# Patient Record
Sex: MALE | Race: OTHER | HISPANIC OR LATINO
[De-identification: names, ages, dates, MRNs, and addresses within clinical notes are randomized per-mention and may not be internally consistent; named-entity substitution may affect disease eponyms.]

---

## 2018-11-06 ENCOUNTER — RESULT REVIEW (OUTPATIENT)
Age: 23
End: 2018-11-06

## 2018-11-06 ENCOUNTER — INPATIENT (INPATIENT)
Facility: HOSPITAL | Age: 23
LOS: 0 days | Discharge: HOME | DRG: 340 | End: 2018-11-07
Attending: GENERAL ACUTE CARE HOSPITAL | Admitting: GENERAL ACUTE CARE HOSPITAL
Payer: COMMERCIAL

## 2018-11-06 VITALS
DIASTOLIC BLOOD PRESSURE: 71 MMHG | WEIGHT: 154.32 LBS | TEMPERATURE: 100 F | RESPIRATION RATE: 18 BRPM | HEIGHT: 69 IN | HEART RATE: 105 BPM | OXYGEN SATURATION: 96 % | SYSTOLIC BLOOD PRESSURE: 136 MMHG

## 2018-11-06 LAB
ALBUMIN SERPL ELPH-MCNC: 4.2 G/DL — SIGNIFICANT CHANGE UP (ref 3.3–5)
ALP SERPL-CCNC: 58 U/L — SIGNIFICANT CHANGE UP (ref 40–120)
ALT FLD-CCNC: 11 U/L — SIGNIFICANT CHANGE UP (ref 10–45)
ANION GAP SERPL CALC-SCNC: 12 MMOL/L — SIGNIFICANT CHANGE UP (ref 5–17)
APPEARANCE UR: CLEAR — SIGNIFICANT CHANGE UP
APTT BLD: 27.5 SEC — SIGNIFICANT CHANGE UP (ref 27.5–36.3)
AST SERPL-CCNC: 23 U/L — SIGNIFICANT CHANGE UP (ref 10–40)
BASOPHILS NFR BLD AUTO: 0.2 % — SIGNIFICANT CHANGE UP (ref 0–2)
BILIRUB SERPL-MCNC: 0.7 MG/DL — SIGNIFICANT CHANGE UP (ref 0.2–1.2)
BILIRUB UR-MCNC: NEGATIVE — SIGNIFICANT CHANGE UP
BLD GP AB SCN SERPL QL: NEGATIVE — SIGNIFICANT CHANGE UP
BUN SERPL-MCNC: 9 MG/DL — SIGNIFICANT CHANGE UP (ref 7–23)
CALCIUM SERPL-MCNC: 8.7 MG/DL — SIGNIFICANT CHANGE UP (ref 8.4–10.5)
CHLORIDE SERPL-SCNC: 93 MMOL/L — LOW (ref 96–108)
CO2 SERPL-SCNC: 28 MMOL/L — SIGNIFICANT CHANGE UP (ref 22–31)
COLOR SPEC: YELLOW — SIGNIFICANT CHANGE UP
CREAT SERPL-MCNC: 1 MG/DL — SIGNIFICANT CHANGE UP (ref 0.5–1.3)
DIFF PNL FLD: NEGATIVE — SIGNIFICANT CHANGE UP
EOSINOPHIL NFR BLD AUTO: 1.8 % — SIGNIFICANT CHANGE UP (ref 0–6)
GLUCOSE SERPL-MCNC: 122 MG/DL — HIGH (ref 70–99)
GLUCOSE UR QL: NEGATIVE — SIGNIFICANT CHANGE UP
HCT VFR BLD CALC: 39.2 % — SIGNIFICANT CHANGE UP (ref 39–50)
HGB BLD-MCNC: 13.9 G/DL — SIGNIFICANT CHANGE UP (ref 13–17)
INR BLD: 1.21 — HIGH (ref 0.88–1.16)
KETONES UR-MCNC: NEGATIVE — SIGNIFICANT CHANGE UP
LEUKOCYTE ESTERASE UR-ACNC: NEGATIVE — SIGNIFICANT CHANGE UP
LYMPHOCYTES # BLD AUTO: 20.3 % — SIGNIFICANT CHANGE UP (ref 13–44)
MCHC RBC-ENTMCNC: 32.9 PG — SIGNIFICANT CHANGE UP (ref 27–34)
MCHC RBC-ENTMCNC: 35.5 G/DL — SIGNIFICANT CHANGE UP (ref 32–36)
MCV RBC AUTO: 92.7 FL — SIGNIFICANT CHANGE UP (ref 80–100)
MONOCYTES NFR BLD AUTO: 9.3 % — SIGNIFICANT CHANGE UP (ref 2–14)
NEUTROPHILS NFR BLD AUTO: 68.4 % — SIGNIFICANT CHANGE UP (ref 43–77)
NITRITE UR-MCNC: NEGATIVE — SIGNIFICANT CHANGE UP
PH UR: 6.5 — SIGNIFICANT CHANGE UP (ref 5–8)
PLATELET # BLD AUTO: 176 K/UL — SIGNIFICANT CHANGE UP (ref 150–400)
POTASSIUM SERPL-MCNC: 4 MMOL/L — SIGNIFICANT CHANGE UP (ref 3.5–5.3)
POTASSIUM SERPL-SCNC: 4 MMOL/L — SIGNIFICANT CHANGE UP (ref 3.5–5.3)
PROT SERPL-MCNC: 7.5 G/DL — SIGNIFICANT CHANGE UP (ref 6–8.3)
PROT UR-MCNC: NEGATIVE MG/DL — SIGNIFICANT CHANGE UP
PROTHROM AB SERPL-ACNC: 13.7 SEC — HIGH (ref 10–12.9)
RAPID RVP RESULT: SIGNIFICANT CHANGE UP
RBC # BLD: 4.23 M/UL — SIGNIFICANT CHANGE UP (ref 4.2–5.8)
RBC # FLD: 11.7 % — SIGNIFICANT CHANGE UP (ref 10.3–16.9)
RH IG SCN BLD-IMP: POSITIVE — SIGNIFICANT CHANGE UP
RH IG SCN BLD-IMP: POSITIVE — SIGNIFICANT CHANGE UP
SODIUM SERPL-SCNC: 133 MMOL/L — LOW (ref 135–145)
SP GR SPEC: <=1.005 — SIGNIFICANT CHANGE UP (ref 1–1.03)
UROBILINOGEN FLD QL: 0.2 E.U./DL — SIGNIFICANT CHANGE UP
WBC # BLD: 9.1 K/UL — SIGNIFICANT CHANGE UP (ref 3.8–10.5)
WBC # FLD AUTO: 9.1 K/UL — SIGNIFICANT CHANGE UP (ref 3.8–10.5)

## 2018-11-06 PROCEDURE — 44970 LAPAROSCOPY APPENDECTOMY: CPT | Mod: GC

## 2018-11-06 PROCEDURE — 99223 1ST HOSP IP/OBS HIGH 75: CPT | Mod: 57,GC

## 2018-11-06 PROCEDURE — 99285 EMERGENCY DEPT VISIT HI MDM: CPT | Mod: 25

## 2018-11-06 PROCEDURE — 74177 CT ABD & PELVIS W/CONTRAST: CPT | Mod: 26

## 2018-11-06 RX ORDER — OXYCODONE AND ACETAMINOPHEN 5; 325 MG/1; MG/1
1 TABLET ORAL EVERY 6 HOURS
Qty: 0 | Refills: 0 | Status: DISCONTINUED | OUTPATIENT
Start: 2018-11-06 | End: 2018-11-07

## 2018-11-06 RX ORDER — SODIUM CHLORIDE 9 MG/ML
1000 INJECTION, SOLUTION INTRAVENOUS
Qty: 0 | Refills: 0 | Status: DISCONTINUED | OUTPATIENT
Start: 2018-11-06 | End: 2018-11-06

## 2018-11-06 RX ORDER — SODIUM CHLORIDE 9 MG/ML
1000 INJECTION INTRAMUSCULAR; INTRAVENOUS; SUBCUTANEOUS ONCE
Qty: 0 | Refills: 0 | Status: COMPLETED | OUTPATIENT
Start: 2018-11-06 | End: 2018-11-06

## 2018-11-06 RX ORDER — ONDANSETRON 8 MG/1
4 TABLET, FILM COATED ORAL EVERY 6 HOURS
Qty: 0 | Refills: 0 | Status: DISCONTINUED | OUTPATIENT
Start: 2018-11-06 | End: 2018-11-06

## 2018-11-06 RX ORDER — CEFOXITIN 1 G/1
2 INJECTION, POWDER, FOR SOLUTION INTRAVENOUS EVERY 6 HOURS
Qty: 0 | Refills: 0 | Status: COMPLETED | OUTPATIENT
Start: 2018-11-06 | End: 2018-11-07

## 2018-11-06 RX ORDER — OXYCODONE AND ACETAMINOPHEN 5; 325 MG/1; MG/1
2 TABLET ORAL EVERY 6 HOURS
Qty: 0 | Refills: 0 | Status: DISCONTINUED | OUTPATIENT
Start: 2018-11-06 | End: 2018-11-07

## 2018-11-06 RX ORDER — ONDANSETRON 8 MG/1
4 TABLET, FILM COATED ORAL ONCE
Qty: 0 | Refills: 0 | Status: DISCONTINUED | OUTPATIENT
Start: 2018-11-06 | End: 2018-11-07

## 2018-11-06 RX ORDER — KETOROLAC TROMETHAMINE 30 MG/ML
15 SYRINGE (ML) INJECTION ONCE
Qty: 0 | Refills: 0 | Status: DISCONTINUED | OUTPATIENT
Start: 2018-11-06 | End: 2018-11-06

## 2018-11-06 RX ORDER — HYDROMORPHONE HYDROCHLORIDE 2 MG/ML
0.5 INJECTION INTRAMUSCULAR; INTRAVENOUS; SUBCUTANEOUS
Qty: 0 | Refills: 0 | Status: DISCONTINUED | OUTPATIENT
Start: 2018-11-06 | End: 2018-11-07

## 2018-11-06 RX ORDER — HYDROMORPHONE HYDROCHLORIDE 2 MG/ML
0.5 INJECTION INTRAMUSCULAR; INTRAVENOUS; SUBCUTANEOUS EVERY 4 HOURS
Qty: 0 | Refills: 0 | Status: DISCONTINUED | OUTPATIENT
Start: 2018-11-06 | End: 2018-11-06

## 2018-11-06 RX ORDER — IOHEXOL 300 MG/ML
30 INJECTION, SOLUTION INTRAVENOUS ONCE
Qty: 0 | Refills: 0 | Status: COMPLETED | OUTPATIENT
Start: 2018-11-06 | End: 2018-11-06

## 2018-11-06 RX ORDER — CEFOXITIN 1 G/1
2 INJECTION, POWDER, FOR SOLUTION INTRAVENOUS ONCE
Qty: 0 | Refills: 0 | Status: COMPLETED | OUTPATIENT
Start: 2018-11-06 | End: 2018-11-06

## 2018-11-06 RX ORDER — HYDROMORPHONE HYDROCHLORIDE 2 MG/ML
1 INJECTION INTRAMUSCULAR; INTRAVENOUS; SUBCUTANEOUS EVERY 4 HOURS
Qty: 0 | Refills: 0 | Status: DISCONTINUED | OUTPATIENT
Start: 2018-11-06 | End: 2018-11-06

## 2018-11-06 RX ORDER — CEFOXITIN 1 G/1
2 INJECTION, POWDER, FOR SOLUTION INTRAVENOUS EVERY 6 HOURS
Qty: 0 | Refills: 0 | Status: DISCONTINUED | OUTPATIENT
Start: 2018-11-06 | End: 2018-11-06

## 2018-11-06 RX ADMIN — OXYCODONE AND ACETAMINOPHEN 1 TABLET(S): 5; 325 TABLET ORAL at 23:46

## 2018-11-06 RX ADMIN — CEFOXITIN 100 GRAM(S): 1 INJECTION, POWDER, FOR SOLUTION INTRAVENOUS at 21:10

## 2018-11-06 RX ADMIN — Medication 15 MILLIGRAM(S): at 01:58

## 2018-11-06 RX ADMIN — CEFOXITIN 100 GRAM(S): 1 INJECTION, POWDER, FOR SOLUTION INTRAVENOUS at 15:00

## 2018-11-06 RX ADMIN — OXYCODONE AND ACETAMINOPHEN 1 TABLET(S): 5; 325 TABLET ORAL at 22:53

## 2018-11-06 RX ADMIN — CEFOXITIN 100 GRAM(S): 1 INJECTION, POWDER, FOR SOLUTION INTRAVENOUS at 05:45

## 2018-11-06 RX ADMIN — IOHEXOL 30 MILLILITER(S): 300 INJECTION, SOLUTION INTRAVENOUS at 01:58

## 2018-11-06 RX ADMIN — SODIUM CHLORIDE 120 MILLILITER(S): 9 INJECTION, SOLUTION INTRAVENOUS at 06:08

## 2018-11-06 RX ADMIN — HYDROMORPHONE HYDROCHLORIDE 1 MILLIGRAM(S): 2 INJECTION INTRAMUSCULAR; INTRAVENOUS; SUBCUTANEOUS at 06:29

## 2018-11-06 RX ADMIN — OXYCODONE AND ACETAMINOPHEN 1 TABLET(S): 5; 325 TABLET ORAL at 17:54

## 2018-11-06 RX ADMIN — OXYCODONE AND ACETAMINOPHEN 1 TABLET(S): 5; 325 TABLET ORAL at 16:52

## 2018-11-06 RX ADMIN — Medication 15 MILLIGRAM(S): at 12:33

## 2018-11-06 RX ADMIN — SODIUM CHLORIDE 2000 MILLILITER(S): 9 INJECTION INTRAMUSCULAR; INTRAVENOUS; SUBCUTANEOUS at 01:58

## 2018-11-06 RX ADMIN — HYDROMORPHONE HYDROCHLORIDE 1 MILLIGRAM(S): 2 INJECTION INTRAMUSCULAR; INTRAVENOUS; SUBCUTANEOUS at 12:34

## 2018-11-06 NOTE — PROGRESS NOTE ADULT - SUBJECTIVE AND OBJECTIVE BOX
PRE-OP NOTE:    Pre-op diagnosis: Acute Appendicitis  Planned procedure:  Laparoscopic, possible open appendectomy  Surgeon: Dr. Cook    HPI:  24 yo M otherwise healthy, on PREP, presents with 2 days of generalized malaise and flu symptoms, and 1 day of persistent abdominal pain. Patient complains that his symptoms began Sunday with flu-like symptoms which he was prescribed Jaimie-Flu for at [facility]. However, during the course of the day on Monday 11/5 he experienced more severe abdominal pain, which localized in his RLQ, and prevented him from being able to be active at which point he presented to ED. Patient denies having any N/V/D. + subjective fevers through course of day    Vital Signs  T(C): 37.6 (06 Nov 2018 00:46), Max: 37.6 (06 Nov 2018 00:46)  T(F): 99.7 (06 Nov 2018 00:46), Max: 99.7 (06 Nov 2018 00:46)  HR: 105 (06 Nov 2018 00:46) (105 - 105)  BP: 136/71 (06 Nov 2018 00:46) (136/71 - 136/71)  RR: 18 (06 Nov 2018 00:46) (18 - 18)  SpO2: 96% (06 Nov 2018 00:46) (96% - 96%) (06 Nov 2018 04:24)      Labs:                         13.9   9.1   )-----------( 176      ( 06 Nov 2018 01:35 )             39.2     11-06    133<L>  |  93<L>  |  9   ----------------------------<  122<H>  4.0   |  28  |  1.00    Ca    8.7      06 Nov 2018 01:35    TPro  7.5  /  Alb  4.2  /  TBili  0.7  /  DBili  x   /  AST  23  /  ALT  11  /  AlkPhos  58  11-06    LIVER FUNCTIONS - ( 06 Nov 2018 01:35 )  Alb: 4.2 g/dL / Pro: 7.5 g/dL / ALK PHOS: 58 U/L / ALT: 11 U/L / AST: 23 U/L / GGT: x           PT/INR - ( 06 Nov 2018 03:47 )   PT: 13.7 sec;   INR: 1.21          PTT - ( 06 Nov 2018 03:47 )  PTT:27.5 sec    Urinalysis:   Urinalysis Basic - ( 06 Nov 2018 01:39 )    Color: Yellow / Appearance: Clear / SG: <=1.005 / pH: x  Gluc: x / Ketone: NEGATIVE  / Bili: Negative / Urobili: 0.2 E.U./dL   Blood: x / Protein: NEGATIVE mg/dL / Nitrite: NEGATIVE   Leuk Esterase: NEGATIVE / RBC: x / WBC x   Sq Epi: x / Non Sq Epi: x / Bacteria: x        T&S x 2: Type + Screen (11.06.18 @ 03:46)    ABO Interpretation: A    Rh Interpretation: Positive    Antibody Screen: Negative      EKG: Available in chart    CXR: N/A, patient in young    Urine BhCG: N/A    A/P: 23yMale to OR   -NPO for OR, IVF  -Operative consent   -Pain/nausea control  -SQH, SCDs, OOB/A

## 2018-11-06 NOTE — ED PROVIDER NOTE - OBJECTIVE STATEMENT
24 yo m with no pmh c/o fever, chills, cough, sore throat that started 3 days ago. Pt reports he started with a cough then developed sore throat, subjective fever, chills and bodyaches. Last night pt developed RLQ pain. Denies n/v/d, dysuria, hematuria, testicular pain, penile discharge, sick contacts or recent travel. Pt went to [facility] today and had a negative rapid flu, strep test and negative CXR. Pt was given tamiflu and sent for a CT scan to r/o appendicitis but was unable to get it done today.

## 2018-11-06 NOTE — H&P ADULT - ASSESSMENT
22 yo M with no PMHx here with acute appendicitis    Admit to Dr. Morris under [facility]  Add-on for Laparoscopic appendectomy  NPO/IVF  Pain/Nausea Control PRN  Iv Cefoxitin pre-operative  SCD  Pre-op labs obtained    Discussed with chief resident on call

## 2018-11-06 NOTE — PROGRESS NOTE ADULT - ASSESSMENT
22 yo M with no PMHx here with acute appendicitis, s/p laparoscopic appendectomy on 11/6.     pain/nausea control  IS/OOB  CLD  cefoxitin x 24 hours  SCD

## 2018-11-06 NOTE — ED PROVIDER NOTE - MEDICAL DECISION MAKING DETAILS
24 yo m with no pmh c/o fever, chills, cough, sore throat that started 3 days ago. Pt reports he started with a cough then developed sore throat, subjective fever, chills and bodyaches. Last night pt developed RLQ pain. T 99.7, . well appearing. Pharynx clear. Lungs cta b/l. Abd mild RLQ ttp. +viral syndrome, r/o appendicitis

## 2018-11-06 NOTE — ED PROVIDER NOTE - ATTENDING CONTRIBUTION TO CARE
Attending Statement: I have personally performed a face to face diagnostic evaluation on this patient. I have reviewed the ACP note and agree with the history, exam and plan of care, except as noted.     Attending Contribution to Care:  pt with RLQ pain, found to have acute appendicitis on CT. IV abx ordered and surgery admitted for likely OR this am. Pt well-appearing with stable VS.

## 2018-11-06 NOTE — PATIENT PROFILE ADULT - FUNCTIONAL SCREEN CURRENT LEVEL: SWALLOWING (IF SCORE 2 OR MORE FOR ANY ITEM, CONSULT REHAB SERVICES), MLM)
Addended by: YARITZA BRENNAN on: 12/13/2017 04:11 PM     Modules accepted: Orders     0 = swallows foods/liquids without difficulty

## 2018-11-06 NOTE — PROGRESS NOTE ADULT - SUBJECTIVE AND OBJECTIVE BOX
Team 4 Surgery Post-Op Note, PCN:     Pre-Op Dx: acute appendicitis  Procedure: Laparoscopic appendectomy    Surgeon: Shahab Cook PGY1    Subjective: Postoperatively, he feels well; his pain is well-controlled. No chest pain, dyspnea, nausea/vomiting, abdominal pain or calf pain. Has urinated without difficulty but has not passed flatus yet.      Vital Signs Last 24 Hrs  T(C): 36.9 (06 Nov 2018 12:34), Max: 38.3 (06 Nov 2018 06:25)  T(F): 98.4 (06 Nov 2018 12:34), Max: 100.9 (06 Nov 2018 06:25)  HR: 98 (06 Nov 2018 12:34) (82 - 107)  BP: 127/68 (06 Nov 2018 12:34) (125/64 - 141/74)  BP(mean): 90 (06 Nov 2018 11:30) (83 - 97)  RR: 16 (06 Nov 2018 12:34) (14 - 25)  SpO2: 97% (06 Nov 2018 12:34) (94% - 99%)    Physical Exam:  General: NAD, resting comfortably in bed  Pulmonary: Nonlabored breathing, no respiratory distress  Abdominal: soft, NT/ND, incisions CDI with Dermabond in place, no theodore-incisional erythema or induration  Extremities: WWP, normal strength, no calf tenderness      LABS:                        13.9   9.1   )-----------( 176      ( 06 Nov 2018 01:35 )             39.2     11-06    133<L>  |  93<L>  |  9   ----------------------------<  122<H>  4.0   |  28  |  1.00    Ca    8.7      06 Nov 2018 01:35    TPro  7.5  /  Alb  4.2  /  TBili  0.7  /  DBili  x   /  AST  23  /  ALT  11  /  AlkPhos  58  11-06    PT/INR - ( 06 Nov 2018 03:47 )   PT: 13.7 sec;   INR: 1.21          PTT - ( 06 Nov 2018 03:47 )  PTT:27.5 sec  CAPILLARY BLOOD GLUCOSE        Urinalysis Basic - ( 06 Nov 2018 01:39 )    Color: Yellow / Appearance: Clear / SG: <=1.005 / pH: x  Gluc: x / Ketone: NEGATIVE  / Bili: Negative / Urobili: 0.2 E.U./dL   Blood: x / Protein: NEGATIVE mg/dL / Nitrite: NEGATIVE   Leuk Esterase: NEGATIVE / RBC: x / WBC x   Sq Epi: x / Non Sq Epi: x / Bacteria: x      LIVER FUNCTIONS - ( 06 Nov 2018 01:35 )  Alb: 4.2 g/dL / Pro: 7.5 g/dL / ALK PHOS: 58 U/L / ALT: 11 U/L / AST: 23 U/L / GGT: x           ABO Interpretation: A (11-06 @ 06:11)

## 2018-11-06 NOTE — ED PROVIDER NOTE - PHYSICAL EXAMINATION
CONSTITUTIONAL: Well-appearing; well-nourished; in no apparent distress.   HEAD: Normocephalic; atraumatic.   EYES: PERRL; EOM intact; conjunctiva and sclera clear  ENT: normal nose; no rhinorrhea; mild pharyngeal erythema    NECK: Supple; non-tender; no LAD  CARDIOVASCULAR: Normal S1, S2; no murmurs, rubs, or gallops. Regular rate and rhythm.   RESPIRATORY: Breathing easily; breath sounds clear and equal bilaterally; no wheezes, rhonchi, or rales.  GI: Soft; non-distended; mild RLQ ttp, no rebound, no guarding  : normal testicular exam   MSK: FROM at all extremities, normal tone   EXT: No cyanosis or edema; N/V intact  SKIN: Normal for age and race; warm; dry; good turgor; no apparent lesions or rash.   NEURO: A & O x 3; face symmetric; grossly unremarkable.   PSYCHOLOGICAL: The patient’s mood and manner are appropriate.

## 2018-11-06 NOTE — ED ADULT NURSE NOTE - CHPI ED NUR SYMPTOMS NEG
no abdominal distension/no dysuria/no fever/no chills/no diarrhea/no blood in stool/no burning urination

## 2018-11-06 NOTE — H&P ADULT - NSHPPHYSICALEXAM_GEN_ALL_CORE
General: NAD  Neurology: A&Ox3, nonfocal, LO x 4  Abdominal: Soft, Tender to palpation in RLQ without rebound or guarding.   Extremities: No edema, + peripheral pulses  Skin: No Rashes, Hematoma, Ecchymosis

## 2018-11-06 NOTE — BRIEF OPERATIVE NOTE - OPERATION/FINDINGS
Densely adhered appendix. Base of appendix divided with Endo Linda Stapler blue load; tip of appendix densely adhered. Stalk of appendix divided with same stapler. Appendiceal stalk removed with endocatch bag. Tip was then able to be dissected from attachment to cecum and removed with second endocatch bag. Closed fascia with 0 Vicryl. Closed skin with 4-0 Monocryl and Dermabond.

## 2018-11-06 NOTE — H&P ADULT - NSHPLABSRESULTS_GEN_ALL_CORE
LABS/RADIOLOGY RESULTS:                          13.9   9.1   )-----------( 176      ( 06 Nov 2018 01:35 )             39.2   11-06    133<L>  |  93<L>  |  9   ----------------------------<  122<H>  4.0   |  28  |  1.00    Ca    8.7      06 Nov 2018 01:35    TPro  7.5  /  Alb  4.2  /  TBili  0.7  /  DBili  x   /  AST  23  /  ALT  11  /  AlkPhos  58  11-06  PT/INR - ( 06 Nov 2018 03:47 )   PT: 13.7 sec;   INR: 1.21          PTT - ( 06 Nov 2018 03:47 )  PTT:27.5 secBlood Cultures    Urinalysis Basic - ( 06 Nov 2018 01:39 )    Color: Yellow / Appearance: Clear / SG: <=1.005 / pH:   Gluc:  / Ketone: NEGATIVE  / Bili: Negative / Urobili: 0.2 E.U./dL   Blood:  / Protein: NEGATIVE mg/dL / Nitrite: NEGATIVE   Leuk Esterase: NEGATIVE / RBC:  / WBC    Sq Epi:  / Non Sq Epi:  / Bacteria:     < from: CT Abdomen and Pelvis w/ Oral Cont and w/ IV Cont (11.06.18 @ 03:20) >    Oral contrast reaches the level of the ileum. There is a dilated appendix   up to 1.8 cm that contains an air-fluid level and fecal material aswell   as periappendiceal fat stranding, small amount of free fluid, and   enlarged lymph nodes up to 1.2 cm, consistent with acute appendicitis. No   free air to suggest perforation. No defined fluid collection.    Images of the pelvis demonstrate the prostate and seminal vesicles to be   normal in appearance.   No filling defects are seen in the urinary   bladder.     Evaluation of the osseous structures demonstrates no significant   abnormality.      IMPRESSION:  Findings consistent with acuteappendicitis. No free air to suggest   perforation.     < end of copied text >

## 2018-11-06 NOTE — H&P ADULT - HISTORY OF PRESENT ILLNESS
24 yo M otherwise healthy, on PREP, presents with 2 days of generalized malaise and flu symptoms, and 1 day of persistent abdominal pain. Patient complains that his symptoms began Sunday with flu-like symptoms which he was prescribed Jaimie-Flu for at [facility]. However, during the course of the day on Monday 11/5 he experienced more severe abdominal pain, which localized in his RLQ, and prevented him from being able to be active.     Vital Signs  T(C): 37.6 (06 Nov 2018 00:46), Max: 37.6 (06 Nov 2018 00:46)  T(F): 99.7 (06 Nov 2018 00:46), Max: 99.7 (06 Nov 2018 00:46)  HR: 105 (06 Nov 2018 00:46) (105 - 105)  BP: 136/71 (06 Nov 2018 00:46) (136/71 - 136/71)  RR: 18 (06 Nov 2018 00:46) (18 - 18)  SpO2: 96% (06 Nov 2018 00:46) (96% - 96%) 22 yo M otherwise healthy, on PREP, presents with 2 days of generalized malaise and flu symptoms, and 1 day of persistent abdominal pain. Patient complains that his symptoms began Sunday with flu-like symptoms which he was prescribed Jaimie-Flu for at [facility]. However, during the course of the day on Monday 11/5 he experienced more severe abdominal pain, which localized in his RLQ, and prevented him from being able to be active at which point he presented to ED. Patient denies having any N/V/D. + subjective fevers through course of day    Vital Signs  T(C): 37.6 (06 Nov 2018 00:46), Max: 37.6 (06 Nov 2018 00:46)  T(F): 99.7 (06 Nov 2018 00:46), Max: 99.7 (06 Nov 2018 00:46)  HR: 105 (06 Nov 2018 00:46) (105 - 105)  BP: 136/71 (06 Nov 2018 00:46) (136/71 - 136/71)  RR: 18 (06 Nov 2018 00:46) (18 - 18)  SpO2: 96% (06 Nov 2018 00:46) (96% - 96%)

## 2018-11-06 NOTE — BRIEF OPERATIVE NOTE - PROCEDURE
<<-----Click on this checkbox to enter Procedure Laparoscopic appendectomy  11/06/2018    Active  PADDISON

## 2018-11-07 ENCOUNTER — TRANSCRIPTION ENCOUNTER (OUTPATIENT)
Age: 23
End: 2018-11-07

## 2018-11-07 VITALS
TEMPERATURE: 99 F | HEART RATE: 72 BPM | RESPIRATION RATE: 17 BRPM | SYSTOLIC BLOOD PRESSURE: 116 MMHG | DIASTOLIC BLOOD PRESSURE: 74 MMHG | OXYGEN SATURATION: 97 %

## 2018-11-07 LAB
CULTURE RESULTS: SIGNIFICANT CHANGE UP
SPECIMEN SOURCE: SIGNIFICANT CHANGE UP
SURGICAL PATHOLOGY STUDY: SIGNIFICANT CHANGE UP

## 2018-11-07 RX ORDER — DOCUSATE SODIUM 100 MG
1 CAPSULE ORAL
Qty: 6 | Refills: 0 | OUTPATIENT
Start: 2018-11-07 | End: 2018-11-09

## 2018-11-07 RX ORDER — EMTRICITABINE AND TENOFOVIR DISOPROXIL FUMARATE 200; 300 MG/1; MG/1
1 TABLET, FILM COATED ORAL
Qty: 0 | Refills: 0 | COMMUNITY

## 2018-11-07 RX ADMIN — CEFOXITIN 100 GRAM(S): 1 INJECTION, POWDER, FOR SOLUTION INTRAVENOUS at 03:13

## 2018-11-07 RX ADMIN — OXYCODONE AND ACETAMINOPHEN 1 TABLET(S): 5; 325 TABLET ORAL at 04:54

## 2018-11-07 RX ADMIN — OXYCODONE AND ACETAMINOPHEN 1 TABLET(S): 5; 325 TABLET ORAL at 05:50

## 2018-11-07 NOTE — DISCHARGE NOTE ADULT - PLAN OF CARE
Full recovery -Continue eating your regular diet as tolerated and drinking plenty of fluids.   -For pain, you may take over-the-counter Tylenoland/or NSAIDS (such as motrin/advil) as labeled, as needed. For breakthrough pain you may take Percocet, which contains Tylenol. Do NOT exceed 4000mg acetaminophen/Tylenol total within 24 hours. Please take Colace 1 tab twice daily while taking narcotic pain medication to avoid constipation.  -No heavy lifting >20 pounds or strenuous exercise.  -You may remove your bandages 48 hours after surgery. Please keep wounds clean and dry.   -You may shower but NO baths and NO swimming. Keep your incisions clean & dry. Avoid a direct stream of water over incision sites. Do not scrub. Pat dry when done.  -Do not remove any Steri-strips; they will fall off on their own after a few showers.  -Contact your doctor or go to the ER for fever > 101.5, chills, nausea, vomiting, chest pain, shortness of breath, pain not controlled by medication or excessive bleeding.  -Please follow up with Dr. Cook in 1-2 weeks; you may call the office to make an appointment at your earliest convenience.

## 2018-11-07 NOTE — DISCHARGE NOTE ADULT - CARE PLAN
Principal Discharge DX:	Appendicitis, acute  Goal:	Full recovery  Assessment and plan of treatment:	-Continue eating your regular diet as tolerated and drinking plenty of fluids.   -For pain, you may take over-the-counter Tylenoland/or NSAIDS (such as motrin/advil) as labeled, as needed. For breakthrough pain you may take Percocet, which contains Tylenol. Do NOT exceed 4000mg acetaminophen/Tylenol total within 24 hours. Please take Colace 1 tab twice daily while taking narcotic pain medication to avoid constipation.  -No heavy lifting >20 pounds or strenuous exercise.  -You may remove your bandages 48 hours after surgery. Please keep wounds clean and dry.   -You may shower but NO baths and NO swimming. Keep your incisions clean & dry. Avoid a direct stream of water over incision sites. Do not scrub. Pat dry when done.  -Do not remove any Steri-strips; they will fall off on their own after a few showers.  -Contact your doctor or go to the ER for fever > 101.5, chills, nausea, vomiting, chest pain, shortness of breath, pain not controlled by medication or excessive bleeding.  -Please follow up with Dr. Cook in 1-2 weeks; you may call the office to make an appointment at your earliest convenience.

## 2018-11-07 NOTE — DISCHARGE NOTE ADULT - HOSPITAL COURSE
23M w/ no medical issues presents w/ abdominal pain 2/2 acute appendicitis for which he underwent laparoscopic appendectomy. Patient's post-operative course was uncomplicated. Diet was advanced as tolerated and pain was well controlled on medication. On day of discharge, pt deemed stable and ready to return home with plan to follow up as an outpatient.

## 2018-11-07 NOTE — DISCHARGE NOTE ADULT - MEDICATION SUMMARY - MEDICATIONS TO TAKE
I will START or STAY ON the medications listed below when I get home from the hospital:    Percocet 5/325 oral tablet  -- 1 tab(s) by mouth every 6 hours, As Needed -for severe pain MDD:4   -- Caution federal law prohibits the transfer of this drug to any person other  than the person for whom it was prescribed.  May cause drowsiness.  Alcohol may intensify this effect.  Use care when operating dangerous machinery.  This prescription cannot be refilled.  This product contains acetaminophen.  Do not use  with any other product containing acetaminophen to prevent possible liver damage.  Using more of this medication than prescribed may cause serious breathing problems.    -- Indication: For Severe pain    Truvada 100 mg-150 mg oral tablet  -- 1 tab(s) by mouth once a day  -- Indication: For Home meds    Colace 100 mg oral capsule  -- 1 cap(s) by mouth 2 times a day   -- Medication should be taken with plenty of water.    -- Indication: For Constipation

## 2018-11-07 NOTE — PROGRESS NOTE ADULT - ASSESSMENT
22 yo M with no PMHx here with acute appendicitis, s/p laparoscopic appendectomy on 11/6.     pain/nausea control  IS/OOB  Regular diet  SCD  Dc home

## 2018-11-07 NOTE — DISCHARGE NOTE ADULT - NS AS ACTIVITY OBS
Stairs allowed/Walking-Indoors allowed/Showering allowed/Walking-Outdoors allowed/No Heavy lifting/straining

## 2018-11-07 NOTE — PROGRESS NOTE ADULT - SUBJECTIVE AND OBJECTIVE BOX
SUBJECTIVE: +flatus, tolerating CLD,  Patient seen and examined bedside by chief resident.        Vital Signs Last 24 Hrs  T(C): 36.7 (07 Nov 2018 06:01), Max: 38.2 (06 Nov 2018 10:26)  T(F): 98 (07 Nov 2018 06:01), Max: 100.7 (06 Nov 2018 10:26)  HR: 66 (07 Nov 2018 06:01) (66 - 102)  BP: 116/52 (07 Nov 2018 06:01) (116/52 - 141/74)  BP(mean): 90 (06 Nov 2018 11:30) (83 - 97)  RR: 18 (07 Nov 2018 06:01) (14 - 25)  SpO2: 97% (07 Nov 2018 06:01) (94% - 99%)  I&O's Detail    06 Nov 2018 07:01  -  07 Nov 2018 07:00  --------------------------------------------------------  IN:    IV PiggyBack: 50 mL    Lactated Ringers IV Bolus: 1800 mL  Total IN: 1850 mL    OUT:    Estimated Blood Loss: 40 mL    Voided: 1600 mL  Total OUT: 1640 mL    Total NET: 210 mL          General: NAD, resting comfortably in bed  C/V: NSR  Pulm: Nonlabored breathing, no respiratory distress  Abd: soft, mild periumbilical/RLQ tenderness, dermabond in place         LABS:                        13.9   9.1   )-----------( 176      ( 06 Nov 2018 01:35 )             39.2     11-06    133<L>  |  93<L>  |  9   ----------------------------<  122<H>  4.0   |  28  |  1.00    Ca    8.7      06 Nov 2018 01:35    TPro  7.5  /  Alb  4.2  /  TBili  0.7  /  DBili  x   /  AST  23  /  ALT  11  /  AlkPhos  58  11-06    PT/INR - ( 06 Nov 2018 03:47 )   PT: 13.7 sec;   INR: 1.21          PTT - ( 06 Nov 2018 03:47 )  PTT:27.5 sec  Urinalysis Basic - ( 06 Nov 2018 01:39 )    Color: Yellow / Appearance: Clear / SG: <=1.005 / pH: x  Gluc: x / Ketone: NEGATIVE  / Bili: Negative / Urobili: 0.2 E.U./dL   Blood: x / Protein: NEGATIVE mg/dL / Nitrite: NEGATIVE   Leuk Esterase: NEGATIVE / RBC: x / WBC x   Sq Epi: x / Non Sq Epi: x / Bacteria: x

## 2018-11-09 PROCEDURE — 36415 COLL VENOUS BLD VENIPUNCTURE: CPT

## 2018-11-09 PROCEDURE — 88304 TISSUE EXAM BY PATHOLOGIST: CPT

## 2018-11-09 PROCEDURE — 87486 CHLMYD PNEUM DNA AMP PROBE: CPT

## 2018-11-09 PROCEDURE — 85025 COMPLETE CBC W/AUTO DIFF WBC: CPT

## 2018-11-09 PROCEDURE — 86850 RBC ANTIBODY SCREEN: CPT

## 2018-11-09 PROCEDURE — 85730 THROMBOPLASTIN TIME PARTIAL: CPT

## 2018-11-09 PROCEDURE — 99285 EMERGENCY DEPT VISIT HI MDM: CPT | Mod: 25

## 2018-11-09 PROCEDURE — 80053 COMPREHEN METABOLIC PANEL: CPT

## 2018-11-09 PROCEDURE — 81003 URINALYSIS AUTO W/O SCOPE: CPT

## 2018-11-09 PROCEDURE — 87086 URINE CULTURE/COLONY COUNT: CPT

## 2018-11-09 PROCEDURE — 87581 M.PNEUMON DNA AMP PROBE: CPT

## 2018-11-09 PROCEDURE — 85610 PROTHROMBIN TIME: CPT

## 2018-11-09 PROCEDURE — 86900 BLOOD TYPING SEROLOGIC ABO: CPT

## 2018-11-09 PROCEDURE — 96374 THER/PROPH/DIAG INJ IV PUSH: CPT | Mod: XU

## 2018-11-09 PROCEDURE — 87798 DETECT AGENT NOS DNA AMP: CPT

## 2018-11-09 PROCEDURE — 74177 CT ABD & PELVIS W/CONTRAST: CPT

## 2018-11-09 PROCEDURE — 87633 RESP VIRUS 12-25 TARGETS: CPT

## 2018-11-09 PROCEDURE — 86901 BLOOD TYPING SEROLOGIC RH(D): CPT

## 2018-11-12 DIAGNOSIS — K35.32 ACUTE APPENDICITIS WITH PERFORATION, LOCALIZED PERITONITIS, AND GANGRENE, WITHOUT ABSCESS: ICD-10-CM

## 2018-11-12 PROBLEM — Z00.00 ENCOUNTER FOR PREVENTIVE HEALTH EXAMINATION: Status: ACTIVE | Noted: 2018-11-12

## 2018-12-04 ENCOUNTER — APPOINTMENT (OUTPATIENT)
Dept: BARIATRICS | Facility: CLINIC | Age: 23
End: 2018-12-04
Payer: COMMERCIAL

## 2018-12-04 VITALS
SYSTOLIC BLOOD PRESSURE: 157 MMHG | DIASTOLIC BLOOD PRESSURE: 79 MMHG | OXYGEN SATURATION: 98 % | WEIGHT: 144.25 LBS | TEMPERATURE: 97.9 F | BODY MASS INDEX: 21.86 KG/M2 | HEIGHT: 68 IN | HEART RATE: 76 BPM

## 2018-12-04 PROCEDURE — 99024 POSTOP FOLLOW-UP VISIT: CPT

## 2020-10-30 NOTE — PATIENT PROFILE ADULT - NSPROGENSOURCEINFO_GEN_A_NUR
Taltz Counseling: I discussed with the patient the risks of ixekizumab including but not limited to immunosuppression, serious infections, worsening of inflammatory bowel disease and drug reactions.  The patient understands that monitoring is required including a PPD at baseline and must alert us or the primary physician if symptoms of infection or other concerning signs are noted. patient

## 2021-01-01 NOTE — DISCHARGE NOTE ADULT - PATIENT PORTAL LINK FT
You can access the [facility] Patient Portal, offered by [facility], by registering with the following website: [email or web address] Statement Selected
